# Patient Record
Sex: MALE | Race: WHITE
[De-identification: names, ages, dates, MRNs, and addresses within clinical notes are randomized per-mention and may not be internally consistent; named-entity substitution may affect disease eponyms.]

---

## 2018-02-12 ENCOUNTER — HOSPITAL ENCOUNTER (EMERGENCY)
Dept: HOSPITAL 17 - NEPK | Age: 43
Discharge: HOME | End: 2018-02-12
Payer: COMMERCIAL

## 2018-02-12 VITALS
TEMPERATURE: 98.1 F | HEART RATE: 92 BPM | OXYGEN SATURATION: 98 % | RESPIRATION RATE: 14 BRPM | SYSTOLIC BLOOD PRESSURE: 120 MMHG | DIASTOLIC BLOOD PRESSURE: 77 MMHG

## 2018-02-12 VITALS — BODY MASS INDEX: 21.72 KG/M2 | HEIGHT: 68 IN | WEIGHT: 143.3 LBS

## 2018-02-12 DIAGNOSIS — Z23: ICD-10-CM
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
## 2018-02-12 DIAGNOSIS — S61.511A: Primary | ICD-10-CM

## 2018-02-12 PROCEDURE — 90714 TD VACC NO PRESV 7 YRS+ IM: CPT

## 2018-02-12 PROCEDURE — 96372 THER/PROPH/DIAG INJ SC/IM: CPT

## 2018-02-12 PROCEDURE — 99284 EMERGENCY DEPT VISIT MOD MDM: CPT

## 2018-02-12 PROCEDURE — 96365 THER/PROPH/DIAG IV INF INIT: CPT

## 2018-02-12 PROCEDURE — 90471 IMMUNIZATION ADMIN: CPT

## 2018-02-12 PROCEDURE — 73110 X-RAY EXAM OF WRIST: CPT

## 2018-02-12 PROCEDURE — 12002 RPR S/N/AX/GEN/TRNK2.6-7.5CM: CPT

## 2018-02-12 NOTE — PD
HPI


Chief Complaint:  Laceration/Skin Injury


Time Seen by Provider:  10:34


Travel History


International Travel<30 days:  No


Contact w/Intl Traveler<30days:  No


Traveled to known affect area:  No





History of Present Illness


HPI


43-year-old male presents for evaluation of right wrist laceration.  Prior to 

arrival the patient reports that he fell, hitting his right wrist against a 

chain saw [place].  The chainsaw was not on at the time.  He now has a laceration 

to the volar aspect of the right wrist which is painful, throbbing, worse with 

palpation.  Denies any numbness, tingling, weakness in the right hand or 

fingers.  His last tetanus vaccination is unknown.  He has no other complaints 

at this time.





Critical access hospital


Past Medical History


Depression:  Yes


Diminished Hearing:  No





Past Surgical History


Abdominal Surgery:  Yes (hernia repair)





Social History


Alcohol Use:  Yes


Tobacco Use:  Yes


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





Allergies-Medications


(Allergen,Severity, Reaction):  


Coded Allergies:  


     No Known Allergies (Verified  Allergy, Unknown, 2/12/18)


Reported Meds & Prescriptions





Reported Meds & Active Scripts


Active


Keflex (Cephalexin) 500 Mg Capsule 500 Mg PO TID 5 Days








Review of Systems


General / Constitutional:  No: Fever, Chills


Skin:  Positive Other (positive for laceration, pain)


Neurologic:  No: Weakness, Paresthesia





Physical Exam


Narrative


GENERAL: Well-developed well-nourished male in no acute distress


SKIN: Warm and dry.  3 cm laceration on the volar medial aspect of the right 

wrist.  Linear with some bleeding, no pulsating blood.


CARDIOVASCULAR: Regular rate and rhythm.  No murmur appreciated.


RESPIRATORY: No accessory muscle use. Clear to auscultation. Breath sounds 

equal bilaterally. 


MUSCULOSKELETAL: Skin as noted above.  2+ radial pulses.  Bobby's test is 

normal suggesting a patent ulnar artery.  Capillary refill less than 2 seconds 

in all digits of the right hand.  The patient maintains full range of motion of 

the right wrist and all fingers of the right hand with full muscle strength on 

flexion and extension.  Distal sensation is preserved in the median, radial and 

ulnar nerve distributions.


NEUROLOGICAL: Awake and alert. No obvious cranial nerve deficits.  Motor 

grossly within normal limits. Normal speech.





Data


Data


Last Documented VS





Vital Signs








  Date Time  Temp Pulse Resp B/P (MAP) Pulse Ox O2 Delivery O2 Flow Rate FiO2


 


2/12/18 10:29 98.1 92 14 120/77 (91) 98   








Orders





 Orders


Iv Access Insert/Monitor (2/12/18 10:34)


Lidocai-Epi 1%-1:100,000 Inj (Xylocaine- (2/12/18 10:45)


Tetanus/Diphtheria Tox Adult (Tetanus/Di (2/12/18 10:45)


Cefazolin 2 Gm Premix (Ancef 2 Gm Premix (2/12/18 10:45)


Wrist, Complete (Ale1dcc) (2/12/18 )


Lidocai-Epi 1%-1:100,000 Inj (Xylocaine- (2/12/18 11:00)


Ed Discharge Order (2/12/18 11:36)








[facility]


Medical Decision Making


Medical Screen Exam Complete:  Yes


Emergency Medical Condition:  Yes


Medical Record Reviewed:  Yes


Differential Diagnosis


Cutaneous laceration, open fracture, neurovascular injury, flexor tendon injury


Narrative Course


The patient will be given IV Ancef.  Tetanus status updated.  X-ray imaging has 

been ordered.  The laceration will be anesthetized and thoroughly irrigated and 

explored.


The laceration was repaired with sutures, he verbally consented.  He will be 

discharged with a short course of Keflex.





Procedures


**Procedure Narrative**


LACERATION


LOCATION: Right wrist


LENGTH: 3 cm


NUMBER OF STITCHES/STAPLES: 8





REPAIR: The area of the laceration was prepped with Betadine and sterilely 

draped.  The laceration was infiltrated with 1% lidocaine with epinephrine.  

The wound was copiously irrigated and explored without evidence of foreign body

, tendon injury or neurovascular injury.  The wound was closed using 4-0 

Prolene simple interrupted. This was a single layer repair. A sterile dressing 

was applied. The patient was advised to keep the dressing clean and dry. 

Patient tolerated the procedure well.





Diagnosis





 Primary Impression:  


 Laceration of arm





***Additional Instructions:  


Wash gently with soap and water and apply antibiotic cream and clean bandages 

daily.  Return in 10-14 days for suture removal.


***Med/Other Pt SpecificInfo:  Prescription(s) given, Wound Care


Scripts


Cephalexin (Keflex) 500 Mg Capsule


500 MG PO TID for Infection for 5 Days, CAP 0 Refills


   Prov: Luis Carlos Alberts MD         2/12/18


Disposition:  01 DISCHARGE HOME


Condition:  Stable











Behzad Wolf PA Feb 12, 2018 10:44

## 2018-02-12 NOTE — RADRPT
EXAM DATE/TIME:  02/12/2018 10:45 

 

[facility] COMPARISON:     

No previous studies available for comparison.

 

                     

INDICATIONS :     

Laceration from chainsaw. 

                     

 

MEDICAL HISTORY :     

None.          

 

SURGICAL HISTORY :     

None.   

 

ENCOUNTER:     

Initial                                        

 

ACUITY:     

1 day      

 

PAIN SCORE:     

7/10

 

LOCATION:     

Right  wrist. 

 

FINDINGS:     

Soft tissue laceration volar aspect of the forearm lateral side without foreign body or fracture.

 

CONCLUSION:     Soft tissue lateral laceration no fracture 

 

 

 Luke Coóln MD FACR on February 12, 2018 at 10:52           

Board Certified Radiologist.

 This report was verified electronically.